# Patient Record
Sex: FEMALE | Race: WHITE | Employment: FULL TIME | URBAN - METROPOLITAN AREA
[De-identification: names, ages, dates, MRNs, and addresses within clinical notes are randomized per-mention and may not be internally consistent; named-entity substitution may affect disease eponyms.]

---

## 2024-09-10 ENCOUNTER — APPOINTMENT (OUTPATIENT)
Dept: OCCUPATIONAL MEDICINE | Facility: CLINIC | Age: 39
End: 2024-09-10

## 2024-09-10 PROCEDURE — 99499 UNLISTED E&M SERVICE: CPT | Performed by: NURSE PRACTITIONER

## 2024-09-10 PROCEDURE — 97799 UNLISTED PHYSCL MED/REHAB PX: CPT | Performed by: NURSE PRACTITIONER

## 2025-02-20 ENCOUNTER — TRANSCRIBE ORDERS (OUTPATIENT)
Dept: OTHER | Age: 40
End: 2025-02-20

## 2025-02-20 DIAGNOSIS — C50.811 MALIGNANT NEOPLASM OF OVERLAPPING SITES OF RIGHT BREAST IN FEMALE, ESTROGEN RECEPTOR POSITIVE (H): Primary | ICD-10-CM

## 2025-02-20 DIAGNOSIS — Z17.0 MALIGNANT NEOPLASM OF OVERLAPPING SITES OF RIGHT BREAST IN FEMALE, ESTROGEN RECEPTOR POSITIVE (H): Primary | ICD-10-CM

## 2025-03-19 PROBLEM — Z17.0 MALIGNANT NEOPLASM OF RIGHT BREAST IN FEMALE, ESTROGEN RECEPTOR POSITIVE (H): Status: ACTIVE | Noted: 2024-12-16

## 2025-03-19 PROBLEM — C50.911 MALIGNANT NEOPLASM OF RIGHT BREAST IN FEMALE, ESTROGEN RECEPTOR POSITIVE (H): Status: ACTIVE | Noted: 2024-12-16

## 2025-03-19 RX ORDER — FERROUS SULFATE 325(65) MG
325 TABLET ORAL
COMMUNITY

## 2025-03-20 ENCOUNTER — OFFICE VISIT (OUTPATIENT)
Dept: RADIATION THERAPY | Facility: OUTPATIENT CENTER | Age: 40
End: 2025-03-20
Attending: SURGERY
Payer: COMMERCIAL

## 2025-03-20 ENCOUNTER — PRE VISIT (OUTPATIENT)
Dept: RADIATION THERAPY | Facility: OUTPATIENT CENTER | Age: 40
End: 2025-03-20

## 2025-03-20 VITALS
HEART RATE: 76 BPM | RESPIRATION RATE: 18 BRPM | DIASTOLIC BLOOD PRESSURE: 88 MMHG | SYSTOLIC BLOOD PRESSURE: 146 MMHG | OXYGEN SATURATION: 97 %

## 2025-03-20 DIAGNOSIS — C50.911 MALIGNANT NEOPLASM OF RIGHT BREAST IN FEMALE, ESTROGEN RECEPTOR POSITIVE (H): Primary | ICD-10-CM

## 2025-03-20 DIAGNOSIS — Z17.0 MALIGNANT NEOPLASM OF RIGHT BREAST IN FEMALE, ESTROGEN RECEPTOR POSITIVE (H): Primary | ICD-10-CM

## 2025-03-20 RX ORDER — SACCHAROMYCES BOULARDII 250 MG
250 CAPSULE ORAL 2 TIMES DAILY
COMMUNITY

## 2025-03-20 RX ORDER — OMEPRAZOLE 20 MG/1
20 TABLET, DELAYED RELEASE ORAL DAILY
COMMUNITY

## 2025-03-20 ASSESSMENT — PAIN SCALES - GENERAL: PAINLEVEL_OUTOF10: NO PAIN (0)

## 2025-03-20 NOTE — NURSING NOTE
REASON FOR APPOINTMENT   Type of Cancer: Breast Cancer, IDC  ER +  HER 2-  Location: R breast  Date of Symptom Onset: felt a lump     TREATMENT TO-DATE FOR THIS CANCER  Surgery ? Dr. Blanca, 1/24/25  lumpectomy and sentinel node bx   Chemotherapy ? Not indicated; Dr. Jennifer Page, Oncotype 16   Other Treatments for this Cancer ? Discussion and education today about radiation    PERSONAL HISTORY OF CANCER   Previous Cancer ? no   Prior Radiation ? no   Prior Chemotherapy ? no   Prior Hormonal Therapy ? Was on birth control, now discontinued     RECENT IMAGING STUDIES  Mammo, US    REFERRALS NEEDED  Offered SW, , psychology     VITALS  BP (!) 146/88   Pulse 76   Resp 18   SpO2 97%     PACEMAKER/IMPLANTED CARDIAC DEVICE no    PAIN  Denies    PSYCHOSOCIAL  Marital Status: S.O.  Patient lives in [place] with son.  Number of children: 1  Working status: full time elementary school para, wants to continue working full time  Do you feel safe in your home? Yes    REVIEW OF SYSTEMS  Skin: negative  Eyes: glasses  Ears/Nose/Throat: negative  Respiratory: No shortness of breath, dyspnea on exertion, cough, or hemoptysis  Cardiovascular: negative  Gastrointestinal: negative  Genitourinary: negative  Musculoskeletal: negative  Neurologic: negative  Psychiatric: negative  Hematologic/Lymphatic/Immunologic: negative  Endocrine: negative    WOMEN ONLY  Any chance you may be pregnant: No, currently at end of monthly cycle  Age at first period:   Date of last period:   Number of pregnancies:   Birth Control pills: discontinued  If yes, for how long:     Radiation Oncology Patient Teaching    Current Concern: patient ready to learn    Person involved with teaching: Patient  Patient asked Questions: Yes  Patient was cooperative: Yes  Patient was receptive (willing to accept information given): Yes    Education Assessment  Comprehension ability: Medium  Knowledge level: Medium  Factors affecting teaching: None    Education  Materials Given  Radiation Therapy and You  Caring for Your Skin During Radiation ...    Educational Topics Discussed  Side effects, Medications, Activity, Adjustment to illness, and When to call MD/RN    Response To Teaching  More review necessary    GYN Only  Vaginal Dilator-given and educated: N/A    Do you have an advanced directive or living will? No  Are you DNR/DNI? No

## 2025-03-20 NOTE — PROGRESS NOTES
Department of Radiation Oncology  Radiation Therapy Center  [facility]  [address], [address]  [place] [address]  [phone]       Consultation Note    Name: Tatianna Last MRN: [id number]   : 1985   Date of Service: 3/20/2025  Referring: Dr. Blanca and Dr. Page     Reason for consultation: Invasive ductal carcinoma of the right breast status postlumpectomy and sentinel lymph node evaluation.  Pathology demonstrated IDC, grade 1, 22 mm, positive DCIS, positive LVSI, negative margin, 0 out of 4 sentinel lymph nodes, ER positive, WV positive, HER2 negative, pathologic T2 N0, Oncotype recurrence score equals 16.  Eval potential role for radiation therapy.    History of Present Illness   Ms. Last is a 40 year old female with a diagnosis of right breast cancer.    Patient underwent a screening mammogram which demonstrated right breast abnormality.  On 2024 biopsy at 12:00, 8 cm from the nipple demonstrated IDC, grade 1, ER positive, WV positive, HER2 negative.  On 2025 the patient underwent right breast lumpectomy and sentinel lymph node evaluation.  Pathology demonstrated IDC, grade 1, 22 mm, positive DCIS, positive LVSI, negative margin, 0 out of 4 sentinel lymph nodes, ER positive, WV positive, HER2 negative, pathologic T2 N0.  Oncotype recurrence score returned at 16.  Systemic chemotherapy was not recommended.    Patient is overall doing well.  No acute complaints.  Feels she has recovered well from her surgery.  No prior radiation.  No pacemaker.    Past Medical History:   No past medical history on file.    Past Surgical History:   No past surgical history on file.    Chemotherapy History:  None    Radiation History:  None    Pregnant: Not Applicable  Implanted Cardiac Devices: No    Medications:  Current Outpatient Medications   Medication Sig Dispense Refill    ferrous sulfate (FEROSUL) 325 (65 Fe) MG tablet Take 325 mg by mouth daily (with  breakfast).      norethindrone-ethinyl estradiol-iron (ESTROSTEP FE) 1-20/1-30/1-35 MG-MCG TABS Take by mouth daily       No current facility-administered medications for this visit.         Allergies:   No Known Allergies    Family History:  No family history on file.    Review of Systems   A 10-point review of systems was performed. Pertinent findings are noted in the HPI.    Physical Exam   ECOG Status: 1    Vitals:  There were no vitals taken for this visit.    Gen: Alert, in NAD  Head: NC/AT  Eyes: PERRL, EOMI, sclera anicteric  Ears: No external auricular lesions  Nose/sinus: No rhinorrhea or epistaxis  Oral cavity/oropharynx: MMM, no visible oral cavity lesions, FOM and BOT are soft to palpation  Neck: Full ROM, supple, no palpable adenopathy  Pulm: No wheezing, stridor or respiratory distress  CV: Extremities are warm and well-perfused, no cyanosis, no pedal edema  Abdominal: Normal bowel sounds, soft, nontender, no masses  Musculoskeletal: No issue with range of motion or lymphedema  Skin: Normal color and turgor  Neuro: A/Ox3, CN II-XII intact, normal gait    Imaging/Path/Labs   Imaging:   Per HPI    Path:   1/24/25  A.  Breast, right, lumpectomy excision:  Invasive ductal carcinoma, Blanco grade 1     B.  Lymph node, sentinel, right axillary sentinel, biopsy:  Four benign lymph nodes (0/4)     C.  Breast, right, additional anterior superior margin excision :  Benign breast tissue   Amendment electronically signed by Prasanna Castro MD on 2/10/2025 at 0911 CST Electronically signed by Jonatan Cruz MD on 1/28/2025 at 1016 CST   Synoptic Report INVASIVE CARCINOMA OF THE BREAST: Resection  INVASIVE CARCINOMA OF THE BREAST: RESECTION - All Specimens  8th Edition - Protocol posted: 6/19/2024    SPECIMEN     Procedure:    Excision (less than total mastectomy)     Specimen Laterality:    Right    TUMOR     Histologic Type:    Invasive carcinoma of no special type (ductal)     Histologic Grade  ([place] Histologic Score):           Glandular (Acinar) / Tubular Differentiation:    Score 2       Nuclear Pleomorphism:    Score 2       Mitotic Rate:    Score 1       Overall Grade:    Grade 1 (scores of 3, 4 or 5)     Tumor Size:    Greatest dimension of largest invasive focus (Millimeters): 22 mm     Tumor Focality:    Single focus of invasive carcinoma     Ductal Carcinoma In Situ (DCIS):    Present       :    Negative for extensive intraductal component (EIC)       Nuclear Grade:    Grade II (intermediate)       Necrosis:    Present, focal (small foci or single cell necrosis)     Lobular Carcinoma In Situ (LCIS):    Present     Lymphatic and / or Vascular Invasion:    Present       :    Focal     Treatment Effect in the Breast:    No known presurgical therapy    MARGINS     Margin Status for Invasive Carcinoma:    All margins negative for invasive carcinoma       Distance from Invasive Carcinoma to Closest Margin:    5 mm       Closest Margin(s) to Invasive Carcinoma:    Lateral     Margin Status for DCIS:    All margins negative for DCIS       Distance from DCIS to Closest Margin:    6 mm       Closest Margin(s) to DCIS:    Lateral    REGIONAL LYMPH NODES     Regional Lymph Node Status:           :    All regional lymph nodes negative for tumor       Total Number of Lymph Nodes Examined (sentinel and non-sentinel):    4       Number of [place] Nodes Examined:    4    pTNM CLASSIFICATION (AJCC 8th Edition)     Reporting of pT, pN, and (when applicable) pM categories is based on information available to the pathologist at the time the report is issued. As per the AJCC (Chapter 1, 8th Ed.) it is the managing physician's responsibility to establish the final pathologic stage based upon all pertinent information, including but potentially not limited to this pathology report.     pT Category:    pT2     pN Category:    pN0     N Suffix:    (sn)       Assessment    Ms. Last is a 40 year old female with  Invasive ductal carcinoma of the right breast status postlumpectomy and sentinel lymph node evaluation.  Pathology demonstrated IDC, grade 1, 22 mm, positive DCIS, positive LVSI, negative margin, 0 out of 4 sentinel lymph nodes, ER positive, CA positive, HER2 negative, pathologic T2 N0 ,Oncotype recurrence score equals 16.  Eval potential role for radiation therapy.    Plan   We discussed that adjuvant radiation is the standard of care for patients with invasive ductal carcinoma after lumpectomy. The most recent update of the EBCTCG metaanalysis for early stage breast cancer showed that for patients with pathologically node negative disease, radiotherapy reduces the 5 year risk of any recurrence from 31% to 15% (Lancet, 2011). This translated into a 3.3% absolute survival benefit at 15 years for all-comers.      We recommend hypofractionated radiation therapy based on results from the [place] hypofractionation trial (Alisson et al, [place], 2010) and the recent update of the [place] START trial (Cory et al, Lancet Oncol, 2013), which both show equivalent local control, survival and cosmesis with these shorter treatment schedules as compared to conventional radiation fractionation. After our discussion, the patient is interested in proceeding with hypofractionated treatment to the right breast.  We recommend a dose of 4005 cGy in 15 fractions followed by 1000  cGy in 5 fraction lumpectomy bed boost.     Today we discussed the logistics of treatment planning and delivery in detail with the patient. We also discussed side effects, including short term risks of fatigue and skin reaction, and long term risks of pneumonitis, lung fibrosis, soft tissue fibrosis, skin changes, breast contour changes, rib fractures, cardiac damage, secondary cancers, lymphedema, and thyroid dysfunction. We described the use of 3D-conformal radiotherapy to minimize dose to the normal tissues, while adequately covering the target tissues, and the  ability of this technique to decrease potential for toxicity.      The risks and benefits of radiation therapy were discussed in detail with the patient. The patient expressed an understanding of these risks and has agreed to proceed with the proposed treatment. Informed consent was obtained.       60 minutes spent on the date of the encounter doing chart review, review of outside records, review of test results, interpretation of tests, patient visit, documentation, discussion, and plan.     Teja Delgado MD  Department of Radiation Oncology  [facility]

## 2025-03-22 ENCOUNTER — HEALTH MAINTENANCE LETTER (OUTPATIENT)
Age: 40
End: 2025-03-22

## 2025-04-02 ENCOUNTER — OFFICE VISIT (OUTPATIENT)
Dept: RADIATION THERAPY | Facility: OUTPATIENT CENTER | Age: 40
End: 2025-04-02
Payer: COMMERCIAL

## 2025-04-02 VITALS — WEIGHT: 242 LBS | HEART RATE: 87 BPM | DIASTOLIC BLOOD PRESSURE: 83 MMHG | SYSTOLIC BLOOD PRESSURE: 117 MMHG

## 2025-04-02 DIAGNOSIS — C50.911 MALIGNANT NEOPLASM OF RIGHT BREAST IN FEMALE, ESTROGEN RECEPTOR POSITIVE, UNSPECIFIED SITE OF BREAST (H): Primary | ICD-10-CM

## 2025-04-02 DIAGNOSIS — Z17.0 MALIGNANT NEOPLASM OF RIGHT BREAST IN FEMALE, ESTROGEN RECEPTOR POSITIVE, UNSPECIFIED SITE OF BREAST (H): Primary | ICD-10-CM

## 2025-04-02 NOTE — PROGRESS NOTES
"[facility]  SPECIALIZING IN BREAKTHROUGHS  Radiation Oncology    On Treatment Visit Note      Tatianna Last      Date: 2025   MRN: [id number]   : 1985  Diagnosis: IDC right breast      Reason for Visit:  On Radiation Treatment Visit     Treatment Summary to Date  Treatment Site: right breast Current Dose: 801/5005 cGy Fractions: 3/20      Chemotherapy  Chemo concurrent with radx?: No    Subjective:   Doing okay.  No acute complaints.  Discussed skin care.  No pruritus.  Energy is okay.    Nursing ROS:   Nutrition Alteration  Diet Type: Patient's Preference  Skin  Skin Reaction: 0 - No changes  Skin Note: eucerin cream and cereva cream        Cardiovascular  Respiratory effort: 1 - Normal - without distress  Gastrointestinal  GI Note: no gi issues        Pain Assessment  Pain Note: no pain issues      Objective:   /83   Pulse 87   Wt 109.8 kg (242 lb)   Skin without erythema or desquamation    Labs:  CBC RESULTS: No results for input(s): \"WBC\", \"RBC\", \"HGB\", \"HCT\", \"MCV\", \"MCH\", \"MCHC\", \"RDW\", \"PLT\" in the last 37272 hours.  ELECTROLYTES:  No results for input(s): \"NA\", \"POTASSIUM\", \"CHLORIDE\", \"LENIN\", \"CO2\", \"BUN\", \"CR\", \"GLC\" in the last 95839 hours.    Assessment:   Ms. Last is a 40 year old female with Invasive ductal carcinoma of the right breast status postlumpectomy and sentinel lymph node evaluation.  Pathology demonstrated IDC, grade 1, 22 mm, positive DCIS, positive LVSI, negative margin, 0 out of 4 sentinel lymph nodes, ER positive, OR positive, HER2 negative, pathologic T2 N0 , Oncotype recurrence score equals 16.  She is undergoing adjuvant radiation therapy.    Tolerating radiation therapy well.  All questions and concerns addressed.    Plan:   Continue current therapy.    Continue skin care.      Mosaiq chart and setup information reviewed  Ports checked    Medication Review  Med list reviewed with patient?: Yes           Teja Delgado MD        "

## 2025-04-02 NOTE — LETTER
"2025      Tatianna Last  [address] [facility] [address]      Dear Colleague,    Thank you for referring your patient, Tatianna Last, to the  [facility]. Please see a copy of my visit note below.    [facility]  SPECIALIZING IN BREAKTHROUGHS  Radiation Oncology    On Treatment Visit Note      Tatianna Last      Date: 2025   MRN: [id number]   : 1985  Diagnosis: IDC right breast      Reason for Visit:  On Radiation Treatment Visit     Treatment Summary to Date  Treatment Site: right breast Current Dose: 801/5005 cGy Fractions: 3/20      Chemotherapy  Chemo concurrent with radx?: No    Subjective:   Doing okay.  No acute complaints.  Discussed skin care.  No pruritus.  Energy is okay.    Nursing ROS:   Nutrition Alteration  Diet Type: Patient's Preference  Skin  Skin Reaction: 0 - No changes  Skin Note: eucerin cream and cereva cream        Cardiovascular  Respiratory effort: 1 - Normal - without distress  Gastrointestinal  GI Note: no gi issues        Pain Assessment  Pain Note: no pain issues      Objective:   /83   Pulse 87   Wt 109.8 kg (242 lb)   Skin without erythema or desquamation    Labs:  CBC RESULTS: No results for input(s): \"WBC\", \"RBC\", \"HGB\", \"HCT\", \"MCV\", \"MCH\", \"MCHC\", \"RDW\", \"PLT\" in the last 50292 hours.  ELECTROLYTES:  No results for input(s): \"NA\", \"POTASSIUM\", \"CHLORIDE\", \"LENIN\", \"CO2\", \"BUN\", \"CR\", \"GLC\" in the last 38111 hours.    Assessment:   Ms. Last is a 40 year old female with Invasive ductal carcinoma of the right breast status postlumpectomy and sentinel lymph node evaluation.  Pathology demonstrated IDC, grade 1, 22 mm, positive DCIS, positive LVSI, negative margin, 0 out of 4 sentinel lymph nodes, ER positive, [place] positive, HER2 negative, pathologic T2 N0 , Oncotype recurrence score equals 16.  She is undergoing adjuvant radiation therapy.    Tolerating radiation therapy well.  All questions and concerns " addressed.    Plan:   Continue current therapy.    Continue skin care.      Mosaiq chart and setup information reviewed  Ports checked    Medication Review  Med list reviewed with patient?: Yes           Teja Delgado MD          Again, thank you for allowing me to participate in the care of your patient.        Sincerely,        Teja Delgado MD    Electronically signed

## 2025-04-09 ENCOUNTER — OFFICE VISIT (OUTPATIENT)
Dept: RADIATION THERAPY | Facility: OUTPATIENT CENTER | Age: 40
End: 2025-04-09
Payer: COMMERCIAL

## 2025-04-09 VITALS
HEART RATE: 74 BPM | SYSTOLIC BLOOD PRESSURE: 117 MMHG | WEIGHT: 245 LBS | OXYGEN SATURATION: 98 % | DIASTOLIC BLOOD PRESSURE: 78 MMHG | RESPIRATION RATE: 18 BRPM

## 2025-04-09 DIAGNOSIS — C50.911 MALIGNANT NEOPLASM OF RIGHT BREAST IN FEMALE, ESTROGEN RECEPTOR POSITIVE, UNSPECIFIED SITE OF BREAST (H): Primary | ICD-10-CM

## 2025-04-09 DIAGNOSIS — Z17.0 MALIGNANT NEOPLASM OF RIGHT BREAST IN FEMALE, ESTROGEN RECEPTOR POSITIVE, UNSPECIFIED SITE OF BREAST (H): Primary | ICD-10-CM

## 2025-04-09 ASSESSMENT — PAIN SCALES - GENERAL: PAINLEVEL_OUTOF10: NO PAIN (0)

## 2025-04-09 NOTE — PROGRESS NOTES
"[facility]  SPECIALIZING IN BREAKTHROUGHS  Radiation Oncology    On Treatment Visit Note      Tatianna Last      Date: 2025   MRN: [id number]   : 1985  Diagnosis: IDC right breast      Reason for Visit:  On Radiation Treatment Visit     Treatment Summary to Date  Treatment Site: right breast Current Dose: 2136/5005 cGy Fractions:       Chemotherapy  Chemo concurrent with radx?: No    Subjective:   Doing okay.  No acute complaints.  Discussed skin care.  No pruritus.  Energy is okay.    Nursing ROS:   Nutrition Alteration  Diet Type: Patient's Preference  Skin  Skin Reaction: 0 - No changes  Skin Note: eucerin cream and cereva cream        Cardiovascular  Respiratory effort: 1 - Normal - without distress  Gastrointestinal  GI Note: no gi issues        Pain Assessment  Pain Note: no pain issues      Objective:   /78   Pulse 74   Resp 18   Wt 111.1 kg (245 lb)   SpO2 98%   Skin with mild erythema without desquamation    Labs:  CBC RESULTS: No results for input(s): \"WBC\", \"RBC\", \"HGB\", \"HCT\", \"MCV\", \"MCH\", \"MCHC\", \"RDW\", \"PLT\" in the last 06960 hours.  ELECTROLYTES:  No results for input(s): \"NA\", \"POTASSIUM\", \"CHLORIDE\", \"LENIN\", \"CO2\", \"BUN\", \"CR\", \"GLC\" in the last 70141 hours.    Assessment:   Ms. Last is a 40 year old female with Invasive ductal carcinoma of the right breast status postlumpectomy and sentinel lymph node evaluation.  Pathology demonstrated IDC, grade 1, 22 mm, positive DCIS, positive LVSI, negative margin, 0 out of 4 sentinel lymph nodes, ER positive, [place] positive, HER2 negative, pathologic T2 N0 , Oncotype recurrence score equals 16.  She is undergoing adjuvant radiation therapy.    Tolerating radiation therapy well.  All questions and concerns addressed.    Plan:   Continue current therapy.    Continue skin care.      Mosaiq chart and setup information reviewed  Ports checked    Medication Review  Med list reviewed with patient?: Yes           Teja Delgado, " MD

## 2025-04-09 NOTE — LETTER
"2025      Tatianna Last  [address] [facility] [address]      Dear Colleague,    Thank you for referring your patient, Tatianna Last, to the  [facility]. Please see a copy of my visit note below.    [facility]  SPECIALIZING IN BREAKTHROUGHS  Radiation Oncology    On Treatment Visit Note      Tatianna Last      Date: 2025   MRN: [id number]   : 1985  Diagnosis: IDC right breast      Reason for Visit:  On Radiation Treatment Visit     Treatment Summary to Date  Treatment Site: right breast Current Dose: 2136/5005 cGy Fractions:       Chemotherapy  Chemo concurrent with radx?: No    Subjective:   Doing okay.  No acute complaints.  Discussed skin care.  No pruritus.  Energy is okay.    Nursing ROS:   Nutrition Alteration  Diet Type: Patient's Preference  Skin  Skin Reaction: 0 - No changes  Skin Note: eucerin cream and cereva cream        Cardiovascular  Respiratory effort: 1 - Normal - without distress  Gastrointestinal  GI Note: no gi issues        Pain Assessment  Pain Note: no pain issues      Objective:   /78   Pulse 74   Resp 18   Wt 111.1 kg (245 lb)   SpO2 98%   Skin with mild erythema without desquamation    Labs:  CBC RESULTS: No results for input(s): \"WBC\", \"RBC\", \"HGB\", \"HCT\", \"MCV\", \"MCH\", \"MCHC\", \"RDW\", \"PLT\" in the last 39464 hours.  ELECTROLYTES:  No results for input(s): \"NA\", \"POTASSIUM\", \"CHLORIDE\", \"LENIN\", \"CO2\", \"BUN\", \"CR\", \"GLC\" in the last 21919 hours.    Assessment:   Ms. Last is a 40 year old female with Invasive ductal carcinoma of the right breast status postlumpectomy and sentinel lymph node evaluation.  Pathology demonstrated IDC, grade 1, 22 mm, positive DCIS, positive LVSI, negative margin, 0 out of 4 sentinel lymph nodes, ER positive, [place] positive, HER2 negative, pathologic T2 N0 , Oncotype recurrence score equals 16.  She is undergoing adjuvant radiation therapy.    Tolerating radiation therapy " well.  All questions and concerns addressed.    Plan:   Continue current therapy.    Continue skin care.      Mosaiq chart and setup information reviewed  Ports checked    Medication Review  Med list reviewed with patient?: Yes           Teja Delgado MD          Again, thank you for allowing me to participate in the care of your patient.        Sincerely,        Teja Delgado MD    Electronically signed

## 2025-04-16 ENCOUNTER — OFFICE VISIT (OUTPATIENT)
Dept: RADIATION THERAPY | Facility: OUTPATIENT CENTER | Age: 40
End: 2025-04-16
Payer: COMMERCIAL

## 2025-04-16 VITALS
RESPIRATION RATE: 18 BRPM | OXYGEN SATURATION: 99 % | DIASTOLIC BLOOD PRESSURE: 84 MMHG | SYSTOLIC BLOOD PRESSURE: 127 MMHG | HEART RATE: 66 BPM | WEIGHT: 240.8 LBS

## 2025-04-16 DIAGNOSIS — Z17.0 MALIGNANT NEOPLASM OF RIGHT BREAST IN FEMALE, ESTROGEN RECEPTOR POSITIVE, UNSPECIFIED SITE OF BREAST (H): Primary | ICD-10-CM

## 2025-04-16 DIAGNOSIS — C50.911 MALIGNANT NEOPLASM OF RIGHT BREAST IN FEMALE, ESTROGEN RECEPTOR POSITIVE, UNSPECIFIED SITE OF BREAST (H): Primary | ICD-10-CM

## 2025-04-16 NOTE — LETTER
"2025      Tatianna Last  [address] [facility] [address]      Dear Colleague,    Thank you for referring your patient, Tatianna Last, to the  [facility]. Please see a copy of my visit note below.    [facility]  SPECIALIZING IN BREAKTHROUGHS  Radiation Oncology    On Treatment Visit Note      Tatianna Last      Date: 2025   MRN: [id number]   : 1985  Diagnosis: IDC right breast      Reason for Visit:  On Radiation Treatment Visit     Treatment Summary to Date  Treatment Site: right breast Current Dose: 3471/5005 cGy Fractions:       Chemotherapy  Chemo concurrent with radx?: No    Subjective:     Has expected skin changes and no other complaints.    Nursing ROS:   Nutrition Alteration  Diet Type: Patient's Preference  Skin  Skin Reaction: 0 - No changes  Skin Note: Cerave ointment 2x/day, MD encouraged using 3x/day at visit today        Cardiovascular  Respiratory effort: 1 - Normal - without distress  Gastrointestinal  GI Note: no gi issues        Pain Assessment  Pain Note: no pain issues      Objective:   /84   Pulse 66   Resp 18   Wt 109.2 kg (240 lb 12.8 oz)   SpO2 99%   Gen: Appears well, in no acute distress  Skin: Mild diffuse erythema over treatment field    Labs:  CBC RESULTS: No results for input(s): \"WBC\", \"RBC\", \"HGB\", \"HCT\", \"MCV\", \"MCH\", \"MCHC\", \"RDW\", \"PLT\" in the last 69274 hours.  ELECTROLYTES:  No results for input(s): \"NA\", \"POTASSIUM\", \"CHLORIDE\", \"LENIN\", \"CO2\", \"BUN\", \"CR\", \"GLC\" in the last 38299 hours.    Assessment:    Tolerating radiation therapy well.  All questions and concerns addressed.    Toxicities:  Dermatitis: Grade 1: Faint erythema or dry desquamation    Plan:   Continue current therapy.    Skin care per above.      Mosaiq chart and setup information reviewed  Ports checked    Medication Review  Med list reviewed with patient?: Yes    Educational Topic Discussed  Education " Instructions: skin care reviewed        Lisa Verde MD    Please do not send letter to referring physician.        Again, thank you for allowing me to participate in the care of your patient.        Sincerely,        ARBEN Verde MD    Electronically signed

## 2025-04-16 NOTE — PROGRESS NOTES
"[facility]  SPECIALIZING IN BREAKTHROUGHS  Radiation Oncology    On Treatment Visit Note      Tatianna Last      Date: 2025   MRN: [id number]   : 1985  Diagnosis: IDC right breast      Reason for Visit:  On Radiation Treatment Visit     Treatment Summary to Date  Treatment Site: right breast Current Dose: 3471/5005 cGy Fractions:       Chemotherapy  Chemo concurrent with radx?: No    Subjective:     Has expected skin changes and no other complaints.    Nursing ROS:   Nutrition Alteration  Diet Type: Patient's Preference  Skin  Skin Reaction: 0 - No changes  Skin Note: Cerave ointment 2x/day, MD encouraged using 3x/day at visit today        Cardiovascular  Respiratory effort: 1 - Normal - without distress  Gastrointestinal  GI Note: no gi issues        Pain Assessment  Pain Note: no pain issues      Objective:   /84   Pulse 66   Resp 18   Wt 109.2 kg (240 lb 12.8 oz)   SpO2 99%   Gen: Appears well, in no acute distress  Skin: Mild diffuse erythema over treatment field    Labs:  CBC RESULTS: No results for input(s): \"WBC\", \"RBC\", \"HGB\", \"HCT\", \"MCV\", \"MCH\", \"MCHC\", \"RDW\", \"PLT\" in the last 40252 hours.  ELECTROLYTES:  No results for input(s): \"NA\", \"POTASSIUM\", \"CHLORIDE\", \"LENIN\", \"CO2\", \"BUN\", \"CR\", \"GLC\" in the last 58229 hours.    Assessment:    Tolerating radiation therapy well.  All questions and concerns addressed.    Toxicities:  Dermatitis: Grade 1: Faint erythema or dry desquamation    Plan:   Continue current therapy.    Skin care per above.      Mosaiq chart and setup information reviewed  Ports checked    Medication Review  Med list reviewed with patient?: Yes    Educational Topic Discussed  Education Instructions: skin care reviewed        Lisa Verde MD    Please do not send letter to referring physician.      "

## 2025-04-23 ENCOUNTER — OFFICE VISIT (OUTPATIENT)
Dept: RADIATION THERAPY | Facility: OUTPATIENT CENTER | Age: 40
End: 2025-04-23
Payer: COMMERCIAL

## 2025-04-23 VITALS — WEIGHT: 244.2 LBS

## 2025-04-23 DIAGNOSIS — L58.9 RADIATION DERMATITIS: Primary | ICD-10-CM

## 2025-04-23 DIAGNOSIS — Z17.0 MALIGNANT NEOPLASM OF RIGHT BREAST IN FEMALE, ESTROGEN RECEPTOR POSITIVE, UNSPECIFIED SITE OF BREAST (H): ICD-10-CM

## 2025-04-23 DIAGNOSIS — C50.911 MALIGNANT NEOPLASM OF RIGHT BREAST IN FEMALE, ESTROGEN RECEPTOR POSITIVE, UNSPECIFIED SITE OF BREAST (H): ICD-10-CM

## 2025-04-23 RX ORDER — SILVER SULFADIAZINE 10 MG/G
CREAM TOPICAL 2 TIMES DAILY
Qty: 85 G | Refills: 1 | Status: SHIPPED | OUTPATIENT
Start: 2025-04-23

## 2025-04-23 NOTE — PROGRESS NOTES
"[facility]  SPECIALIZING IN BREAKTHROUGHS  Radiation Oncology    On Treatment Visit Note      Tatianna Last      Date: 2025   MRN: [id number]   : 1985  Diagnosis: IDC right breast      Reason for Visit:  On Radiation Treatment Visit     Treatment Summary to Date  Treatment Site: right breast Current Dose: 4605/5005 cGy Fractions:       Chemotherapy  Chemo concurrent with radx?: No    Subjective:   Doing okay.  No acute complaints.  Discussed skin care.  No pruritus.  Energy is okay.    Nursing ROS:   Nutrition Alteration  Diet Type: Patient's Preference  Skin  Skin Reaction: 0 - No changes  Skin Note: eucerin cream and cereva cream        Cardiovascular  Respiratory effort: 1 - Normal - without distress  Gastrointestinal  GI Note: no gi issues        Pain Assessment  Pain Note: no pain issues      Objective:   Wt 110.8 kg (244 lb 3.2 oz)   Skin with moderate erythema with small area of desquamation in inframammary fold    Labs:  CBC RESULTS: No results for input(s): \"WBC\", \"RBC\", \"HGB\", \"HCT\", \"MCV\", \"MCH\", \"MCHC\", \"RDW\", \"PLT\" in the last 96374 hours.  ELECTROLYTES:  No results for input(s): \"NA\", \"POTASSIUM\", \"CHLORIDE\", \"LENIN\", \"CO2\", \"BUN\", \"CR\", \"GLC\" in the last 59635 hours.    Assessment:   Ms. Last is a 40 year old female with Invasive ductal carcinoma of the right breast status postlumpectomy and sentinel lymph node evaluation.  Pathology demonstrated IDC, grade 1, 22 mm, positive DCIS, positive LVSI, negative margin, 0 out of 4 sentinel lymph nodes, ER positive, [place] positive, HER2 negative, pathologic T2 N0 , Oncotype recurrence score equals 16.  She is undergoing adjuvant radiation therapy.    Tolerating radiation therapy well.  All questions and concerns addressed.    Plan:   Continue current therapy.  EOT on Friday. RTC in 1 month, will see PA.   Continue skin care.  Will add Silvadene for area of desquamation.      [facility] chart and setup information reviewed  Ports " checked    Medication Review  Med list reviewed with patient?: Yes           Teja Delgado MD

## 2025-04-23 NOTE — LETTER
"2025      Tatianna Last  [id number] [facility] [id number]      Dear Colleague,    Thank you for referring your patient, Tatianna Last, to the  [facility]. Please see a copy of my visit note below.    [facility]  SPECIALIZING IN BREAKTHROUGHS  Radiation Oncology    On Treatment Visit Note      Tatianna Last      Date: 2025   MRN: [id number]   : 1985  Diagnosis: IDC right breast      Reason for Visit:  On Radiation Treatment Visit     Treatment Summary to Date  Treatment Site: right breast Current Dose: 4605/5005 cGy Fractions:       Chemotherapy  Chemo concurrent with radx?: No    Subjective:   Doing okay.  No acute complaints.  Discussed skin care.  No pruritus.  Energy is okay.    Nursing ROS:   Nutrition Alteration  Diet Type: Patient's Preference  Skin  Skin Reaction: 0 - No changes  Skin Note: eucerin cream and cereva cream        Cardiovascular  Respiratory effort: 1 - Normal - without distress  Gastrointestinal  GI Note: no gi issues        Pain Assessment  Pain Note: no pain issues      Objective:   Wt 110.8 kg (244 lb 3.2 oz)   Skin with moderate erythema without desquamation    Labs:  CBC RESULTS: No results for input(s): \"WBC\", \"RBC\", \"HGB\", \"HCT\", \"MCV\", \"MCH\", \"MCHC\", \"RDW\", \"PLT\" in the last 56280 hours.  ELECTROLYTES:  No results for input(s): \"NA\", \"POTASSIUM\", \"CHLORIDE\", \"LENIN\", \"CO2\", \"BUN\", \"CR\", \"GLC\" in the last 23351 hours.    Assessment:   Ms. Last is a 40 year old female with Invasive ductal carcinoma of the right breast status postlumpectomy and sentinel lymph node evaluation.  Pathology demonstrated IDC, grade 1, 22 mm, positive DCIS, positive LVSI, negative margin, 0 out of 4 sentinel lymph nodes, ER positive, [place] positive, HER2 negative, pathologic T2 N0 , Oncotype recurrence score equals 16.  She is undergoing adjuvant radiation therapy.    Tolerating radiation therapy well.  All questions and concerns " addressed.    Plan:   Continue current therapy.  EOT on Friday. RTC in 1 month, will see PA.   Continue skin care.      [facility] chart and setup information reviewed  Ports checked    Medication Review  Med list reviewed with patient?: Yes           Teja Delgado MD          Again, thank you for allowing me to participate in the care of your patient.        Sincerely,        Teja Delgado MD    Electronically signed